# Patient Record
Sex: FEMALE | Race: WHITE | NOT HISPANIC OR LATINO | URBAN - METROPOLITAN AREA
[De-identification: names, ages, dates, MRNs, and addresses within clinical notes are randomized per-mention and may not be internally consistent; named-entity substitution may affect disease eponyms.]

---

## 2021-02-16 ENCOUNTER — RECORDS - HEALTHEAST (OUTPATIENT)
Dept: SCHEDULING | Facility: CLINIC | Age: 51
End: 2021-02-16

## 2021-02-16 DIAGNOSIS — Z12.31 VISIT FOR SCREENING MAMMOGRAM: ICD-10-CM

## 2021-03-09 ENCOUNTER — OFFICE VISIT - HEALTHEAST (OUTPATIENT)
Dept: FAMILY MEDICINE | Facility: CLINIC | Age: 51
End: 2021-03-09

## 2021-03-09 DIAGNOSIS — E66.811 CLASS 1 OBESITY DUE TO EXCESS CALORIES WITHOUT SERIOUS COMORBIDITY WITH BODY MASS INDEX (BMI) OF 30.0 TO 30.9 IN ADULT: ICD-10-CM

## 2021-03-09 DIAGNOSIS — Z12.11 ENCOUNTER FOR SCREENING COLONOSCOPY: ICD-10-CM

## 2021-03-09 DIAGNOSIS — E66.09 CLASS 1 OBESITY DUE TO EXCESS CALORIES WITHOUT SERIOUS COMORBIDITY WITH BODY MASS INDEX (BMI) OF 30.0 TO 30.9 IN ADULT: ICD-10-CM

## 2021-03-09 DIAGNOSIS — Z00.00 ENCOUNTER FOR GENERAL ADULT MEDICAL EXAMINATION WITHOUT ABNORMAL FINDINGS: ICD-10-CM

## 2021-03-09 LAB
CHOLEST SERPL-MCNC: 275 MG/DL
CHOLEST SERPL-MCNC: 275 MG/DL
FASTING STATUS PATIENT QL REPORTED: ABNORMAL
FASTING STATUS PATIENT QL REPORTED: ABNORMAL
HBA1C MFR BLD: 5.5 %
HDLC SERPL-MCNC: 68 MG/DL
HDLC SERPL-MCNC: 68 MG/DL
LDLC SERPL CALC-MCNC: 187 MG/DL
LDLC SERPL CALC-MCNC: 187 MG/DL
TRIGL SERPL-MCNC: 102 MG/DL
TRIGL SERPL-MCNC: 102 MG/DL
TSH SERPL DL<=0.005 MIU/L-ACNC: 1.35 UIU/ML (ref 0.3–5)

## 2021-03-09 ASSESSMENT — MIFFLIN-ST. JEOR: SCORE: 1498.12

## 2021-03-16 ENCOUNTER — COMMUNICATION - HEALTHEAST (OUTPATIENT)
Dept: FAMILY MEDICINE | Facility: CLINIC | Age: 51
End: 2021-03-16

## 2021-04-09 ENCOUNTER — RECORDS - HEALTHEAST (OUTPATIENT)
Dept: ADMINISTRATIVE | Facility: OTHER | Age: 51
End: 2021-04-09

## 2021-06-05 VITALS
OXYGEN SATURATION: 98 % | BODY MASS INDEX: 30.7 KG/M2 | HEIGHT: 66 IN | SYSTOLIC BLOOD PRESSURE: 122 MMHG | DIASTOLIC BLOOD PRESSURE: 82 MMHG | WEIGHT: 191 LBS | HEART RATE: 66 BPM

## 2021-06-15 NOTE — PROGRESS NOTES
FEMALE PREVENTATIVE EXAM    Assessment and Plan:     Patient has been advised of split billing requirements and indicates understanding: Yes    Problem List Items Addressed This Visit     None      Visit Diagnoses     Encounter for general adult medical examination without abnormal findings    -  Primary    Relevant Orders    Lipid [place] FASTING (Completed)    Glycosylated Hemoglobin A1c (Completed)    Thyroid Stimulating Hormone (TSH) (Completed)    Lipid Profile (Completed)    Encounter for screening colonoscopy        Relevant Orders    Ambulatory referral for Colonoscopy    Class 1 obesity due to excess calories without serious comorbidity with body mass index (BMI) of 30.0 to 30.9 in adult            Preventative counseling as noted below  UTD Pap and mammogram  No high risk behaviors identified  Due for colonoscopy, order placed today  BMI 30.83-lifestyle counseling provided  Baseline blood work obtained  Patient menopausal, not on birth control and denies any postmenopausal bleeding.  Mood stable    Next follow up:  1 year for routine preventative.     Immunization Review  Adult Imm Review: No immunizations due today  BMI: 30.83  no tobacco use    I discussed the following with the patient:   Adult Healthy Living: Importance of regular exercise  Healthy nutrition  Weight loss referral options  Getting adequate sleep  Stress management  Firearm safety  STI prevention    I have had an Advance Directives discussion with the patient.    Subjective:   Chief Complaint: Tali Rahman is an 50 y.o. female here for a preventative health visit.    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  Wanted to get an annual done before her insurance runs out.    Recently went to go see her eye doctor, her dentist  "and OB/GYN.    Had Pap smear done last week and it was normal.    Has a mammogram that is already ordered    Wanted to come here today to get a referral for colonoscopy.    Is G0, P0  Sexually active: No  Does have a boyfriend that lives out of state  Menopausal with no postmenopausal bleeding      Smoker - quit 30 years ago   ETOH = causal   Illicit drug/Marijuana/vaping  - no     Healthy Habits  Are you taking a daily aspirin? No  Do you typically exercising at least 40 min, 3-4 times per week?  Yes  Do you usually eat at least 4 servings of fruit and vegetables a day, include whole grains and fiber and avoid regularly eating high fat foods? Yes  Have you had an eye exam in the past two years? Yes  Do you see a dentist twice per year? Yes  Do you have any concerns regarding sleep? No    Safety Screen  If you own firearms, are they secured in a locked gun cabinet or with trigger locks? NO  Do you feel you are safe where you are living?: Yes (3/9/2021  2:32 PM)  Do you feel you are safe in your relationship(s)?: Yes (3/9/2021  2:32 PM)      Review of Systems:  Please see above.  The rest of the review of systems are negative for all systems.     Cancer Screening       Status Date      MAMMOGRAM Overdue 6/29/2017      Done 6/29/2016 MAMMO SCREENING BILATERAL     Patient has more history with this topic...    PAP SMEAR Postponed 6/9/2021 Originally 7/29/1990. Waiting for Documentation/Doc Correction          Patient Care Team:  Angely Pierce MD as PCP - General (Obstetrics and Gynecology)        History     Reviewed By Date/Time Sections Reviewed    Deep Martinez CMA 3/9/2021  2:36 PM Tobacco    Deep Martinez CMA 3/9/2021  2:33 PM Tobacco, Alcohol, Drug Use            Objective:   Vital Signs:   Visit Vitals  /82   Pulse 66   Ht 5' 6\" (1.676 m)   Wt 191 lb (86.6 kg)   SpO2 98%   BMI 30.83 kg/m           Physical Exam  Constitutional:       Appearance: Normal appearance. She is normal weight.   HENT: "      Head: Normocephalic and atraumatic.      Right Ear: Tympanic membrane and external ear normal. There is no impacted cerumen.      Left Ear: Tympanic membrane and external ear normal. There is no impacted cerumen.      Nose: Nose normal. No congestion or rhinorrhea.      Mouth/Throat:      Mouth: Mucous membranes are moist.      Pharynx: Oropharynx is clear. No oropharyngeal exudate or posterior oropharyngeal erythema.   Eyes:      General: No scleral icterus.        Right eye: No discharge.         Left eye: No discharge.      Extraocular Movements: Extraocular movements intact.      Conjunctiva/sclera: Conjunctivae normal.      Pupils: Pupils are equal, round, and reactive to light.   Neck:      Musculoskeletal: Normal range of motion and neck supple. No neck rigidity or muscular tenderness.      Comments: No thyromegaly  Cardiovascular:      Rate and Rhythm: Normal rate and regular rhythm.      Pulses: Normal pulses.      Heart sounds: Normal heart sounds. No murmur. No friction rub. No gallop.    Abdominal:      General: Abdomen is flat. Bowel sounds are normal. There is no distension.      Palpations: Abdomen is soft. There is no mass.      Tenderness: There is no abdominal tenderness. There is no right CVA tenderness, left CVA tenderness, guarding or rebound.      Hernia: No hernia is present.   Musculoskeletal: Normal range of motion.         General: No swelling, tenderness, deformity or signs of injury.      Right lower leg: No edema.      Left lower leg: No edema.   Lymphadenopathy:      Cervical: No cervical adenopathy.   Skin:     General: Skin is warm and dry.      Coloration: Skin is not jaundiced.      Findings: No bruising or erythema.   Neurological:      General: No focal deficit present.      Mental Status: She is alert and oriented to person, place, and time. Mental status is at baseline.      Sensory: No sensory deficit.      Motor: No weakness.      Gait: Gait normal.   Psychiatric:          Mood and Affect: Mood normal.         Behavior: Behavior normal.         The ASCVD Risk score (Renothelma CABEZAS Jr., et al., 2013) failed to calculate for the following reasons:    Cannot find a previous HDL lab    Cannot find a previous total cholesterol lab         Medication List      as of March 9, 2021 11:59 PM     You have not been prescribed any medications.

## 2021-06-21 NOTE — LETTER
Letter by Vanna Lopez DO at      Author: Vanna Lopez DO Service: -- Author Type: --    Filed:  Encounter Date: 3/16/2021 Status: (Other)        Tali JON Rahman  [address]  [place] [address]             March 16, 2021         Dear Tali Rahman,    Below are the results from Tali's recent visit:    Resulted Orders   Lipid [place] FASTING   Result Value Ref Range    Cholesterol 275 (H) <=199 mg/dL    Triglycerides 102 <=149 mg/dL    HDL Cholesterol 68 >=50 mg/dL    LDL Calculated 187 (H) <=129 mg/dL    Patient Fasting > 8hrs? Unknown    Glycosylated Hemoglobin A1c   Result Value Ref Range    Hemoglobin A1c 5.5 <=5.6 %   Thyroid Stimulating Hormone (TSH)   Result Value Ref Range    TSH 1.35 0.30 - 5.00 uIU/mL   Lipid Profile   Result Value Ref Range    Triglycerides 102 <=149 mg/dL    Cholesterol 275 (H) <=199 mg/dL    LDL Calculated 187 (H) <=129 mg/dL    HDL Cholesterol 68 >=50 mg/dL    Patient Fasting > 8hrs? Unknown         Bloodwork looks cgood except for triglyceride levels ( fatty cells in the body) and elevated LDL cholesterol. This is  related mostly to diet and exercise. The levels aren't high enough to warrant medication. Should work on cutting  out fatty foots, increasing fruit/veggie to help bring these numbers down. Will recheck in a year.     Please call with questions or contact us using [employer].    Sincerely,        Electronically signed by Vanna Lopez DO

## 2021-07-13 ENCOUNTER — RECORDS - HEALTHEAST (OUTPATIENT)
Dept: ADMINISTRATIVE | Facility: CLINIC | Age: 51
End: 2021-07-13

## 2021-07-21 ENCOUNTER — RECORDS - HEALTHEAST (OUTPATIENT)
Dept: ADMINISTRATIVE | Facility: CLINIC | Age: 51
End: 2021-07-21

## 2024-04-10 ENCOUNTER — TRANSFERRED RECORDS (OUTPATIENT)
Dept: HEALTH INFORMATION MANAGEMENT | Facility: CLINIC | Age: 54
End: 2024-04-10

## 2025-07-08 ENCOUNTER — TRANSFERRED RECORDS (OUTPATIENT)
Dept: MULTI SPECIALTY CLINIC | Facility: CLINIC | Age: 55
End: 2025-07-08
Payer: COMMERCIAL

## 2025-07-20 ENCOUNTER — HEALTH MAINTENANCE LETTER (OUTPATIENT)
Age: 55
End: 2025-07-20

## 2025-07-21 ENCOUNTER — OFFICE VISIT (OUTPATIENT)
Dept: FAMILY MEDICINE | Facility: CLINIC | Age: 55
End: 2025-07-21
Payer: COMMERCIAL

## 2025-07-21 VITALS
RESPIRATION RATE: 18 BRPM | HEART RATE: 74 BPM | DIASTOLIC BLOOD PRESSURE: 78 MMHG | SYSTOLIC BLOOD PRESSURE: 120 MMHG | BODY MASS INDEX: 35.47 KG/M2 | TEMPERATURE: 98.1 F | WEIGHT: 215.5 LBS | OXYGEN SATURATION: 96 %

## 2025-07-21 DIAGNOSIS — T88.7XXA MEDICATION SIDE EFFECTS: ICD-10-CM

## 2025-07-21 DIAGNOSIS — E66.1 CLASS 2 DRUG-INDUCED OBESITY WITH SERIOUS COMORBIDITY AND BODY MASS INDEX (BMI) OF 35.0 TO 35.9 IN ADULT: ICD-10-CM

## 2025-07-21 DIAGNOSIS — E66.812 CLASS 2 DRUG-INDUCED OBESITY WITH SERIOUS COMORBIDITY AND BODY MASS INDEX (BMI) OF 35.0 TO 35.9 IN ADULT: ICD-10-CM

## 2025-07-21 DIAGNOSIS — E78.5 HYPERLIPIDEMIA LDL GOAL <100: ICD-10-CM

## 2025-07-21 DIAGNOSIS — R41.840 POOR CONCENTRATION: Primary | ICD-10-CM

## 2025-07-21 LAB
EST. AVERAGE GLUCOSE BLD GHB EST-MCNC: 117 MG/DL
HBA1C MFR BLD: 5.7 % (ref 0–5.6)

## 2025-07-21 PROCEDURE — 99215 OFFICE O/P EST HI 40 MIN: CPT | Mod: 25

## 2025-07-21 PROCEDURE — 36415 COLL VENOUS BLD VENIPUNCTURE: CPT

## 2025-07-21 PROCEDURE — 90471 IMMUNIZATION ADMIN: CPT

## 2025-07-21 PROCEDURE — 3074F SYST BP LT 130 MM HG: CPT

## 2025-07-21 PROCEDURE — 83036 HEMOGLOBIN GLYCOSYLATED A1C: CPT

## 2025-07-21 PROCEDURE — G2211 COMPLEX E/M VISIT ADD ON: HCPCS

## 2025-07-21 PROCEDURE — 3078F DIAST BP <80 MM HG: CPT

## 2025-07-21 PROCEDURE — 90746 HEPB VACCINE 3 DOSE ADULT IM: CPT

## 2025-07-21 RX ORDER — BUPROPION HYDROCHLORIDE 150 MG/1
150 TABLET ORAL EVERY MORNING
Qty: 30 TABLET | Refills: 0 | Status: SHIPPED | OUTPATIENT
Start: 2025-07-21

## 2025-07-21 RX ORDER — ONDANSETRON 4 MG/1
4 TABLET, ORALLY DISINTEGRATING ORAL EVERY 8 HOURS PRN
Qty: 21 TABLET | Refills: 1 | Status: SHIPPED | OUTPATIENT
Start: 2025-07-21

## 2025-07-21 NOTE — PROGRESS NOTES
"  Assessment & Plan     Poor concentration / Concern for ADHD  Symptoms: Disorganized thought process, over-talking, talking fast, difficulty completing tasks without time constraints, difficulty with organization, need for visual cues, fidgeting, restlessness, difficulty concentrating, talking over others, and other symptoms consistent with ADHD tendencies. Differential diagnosis: ADHD vs. other causes of poor concentration (e.g., mood disorder, medication side effects).  I discussed with patient that she is having some symptoms concerning for ADHD however her soft checklist was not overtly positive for ADHD at this time I cannot prescribe stimulants until she gets formal diagnosis testing.  In the meantime I can start a nonstimulant medication that could help address concentration and organization.  Patient does not have history of seizures or anorexia.  - Referral to neuropsychology for comprehensive ADHD testing.  - Start bupropion (Wellbutrin), a non-stimulant medication, to address concentration, organization, and energy.  - Monitor for side effects, especially potential worsening of anxiety.  - Follow-up in one month to assess response to medication and review symptoms.  - Consider dose adjustment of bupropion based on response at follow-up.  - Adult Mental Health  Referral; Future  - buPROPion (WELLBUTRIN XL) 150 MG 24 hr tablet; Take 1 tablet (150 mg) by mouth every morning.    BMI 35.0-35.9,adult  Chronic. Symptoms: Significant weight gain, history of binge eating, current weight 215 lbs, BMI 35.47, eating pattern described as \"nothing or everything,\" improved mental clarity and mood with better eating habits. Comorbidity: Hyperlipidemia.  - Hemoglobin A1c; Future  - tirzepatide-weight management (ZEPBOUND) 5 MG/0.5ML vial; Inject 0.5 mLs (5 mg) subcutaneously once a week.    Class 2 drug-induced obesity with serious comorbidity and body mass index (BMI) of 35.0 to 35.9 in adult  Discussed options " for weight loss medications including GLP-1 inhibitors versus a stimulant such as phentermine/topiramate.  Patient opts to do Zepbound first.  Will run through insurance to see if this is covered.  If this is not covered by insurance we will do self-pay.   Patient to follow-up in 60 days to recheck vital signs and gauge responsiveness to medication.  Consider titrating up by the medication slowly.    Discussed side effects of both medications in depth today: Discussed the risk of weight gain after discontinuing medications.  That some studies report gaining the weight back in 6 to 12 months after cessation of medication and that the long-term plan is for healthy lifestyle and sustaining such.  Zepbound :nausea vomiting, abdominal pain, diarrhea, constipation, gastroparesis.  Discussed not eating 2 hours before laying down with this medication.  Discussed how to administer subcu injection.  Patient to ask further questions with her physician upon receiving the medication.    Discussed the importance of physical activity.  Goal for physical activity should be 30 minutes 5 days a week minimum.  Patient's personal goal is to be active at least 2-3 days a week.   Discussed nutrition in depth.  Discussed recommendation for balanced sustainable nutrition.  Diet should include high amounts of Fiber from fruits and vegetables in addition to a quarter protein and core carbohydrates.  I recommend against complete cessation of carbohydrates for long-term nutrition plan.   Also we will check for comorbidities such as diabetes mellitus type 2 And high cholesterol.  Previous labs reviewed.  Prescription for Zofran provided to manage potential nausea. Follow-up in eight weeks to assess progress. Risks and side effects: Zepbound may cause nausea, vomiting, GI upset, and potential hair loss.  - Hemoglobin A1c; Future  - tirzepatide-weight management (ZEPBOUND) 5 MG/0.5ML vial; Inject 0.5 mLs (5 mg) subcutaneously once a  week.    Hyperlipidemia LDL goal <100  Medication side effects  Chronic. Symptoms: High cholesterol, LDL cholesterol 200 mg/dL (twice the goal), high glucose on recent labs.  - Provided dietary counseling and information on cholesterol-lowering diets (DASH, Mediterranean).  - Deferred initiation of statin therapy to allow for trial of dietary and lifestyle modification.  - Re-evaluate cholesterol and lipid panel in six months.  - If cholesterol remains elevated, consider starting statin medication.  - Monitor for improvement in cholesterol and glucose with weight loss and dietary changes.  - Hemoglobin A1c; Future  - ondansetron (ZOFRAN ODT) 4 MG ODT tab; Take 1 tablet (4 mg) by mouth every 8 hours as needed for nausea.    Spent 50mins doing chart review, history and exam, patient education, lab review, result management, documentation and further activities per the note.The longitudinal plan of care for the diagnosis(es)/condition(s) as documented were addressed during this visit. Due to the added complexity in care, I will continue to support Tali in the subsequent management and with ongoing continuity of care.    Subjective   Tali is a 54 year old, presenting for the following health issues:  RECHECK (ADHD, menopause)        7/21/2025    11:28 AM   Additional Questions   Roomed by Leola VAUGHN MA     Via the Health Maintenance questionnaire, the patient has reported the following services have been completed -Mammogram: [facility] 2025-07-08, this information has been sent to the abstraction team.  History of Present Illness       Hyperlipidemia:  She presents for follow up of hyperlipidemia.   She is not taking medication to lower cholesterol. She is not having myalgia or other side effects to statin medications.    Reason for visit:  Follow up    She eats 0-1 servings of fruits and vegetables daily.She consumes 0 sweetened beverage(s) daily.She exercises with enough effort to increase her heart rate 10 to 19  "minutes per day.  She exercises with enough effort to increase her heart rate 3 or less days per week.   She is taking medications regularly.  Tali Rahman, 54 years    Attention and concentration difficulties  - Reported feeling disorganized in thought process at times, but not always  - Noted over-talking, with brain moving faster than mouth, leading to talking fast and sometimes talking over others  - Difficulty completing tasks unless given a specific time frame; more productive with time constraints  - Difficulty with organization at home; appreciates neatness but is not a neat person  - Needs to see items visually or they are forgotten (\"if it's in a drawer, it doesn't exist\")  - Often has difficulty getting things done in order when a task requires organization   - Avoids or delays starting tasks that require a lot of thought  -  Often fidgets or squirms with hands or feet when sitting for long periods: quite often  - Sometimes feels overly active or compelled to do things as if driven by a motor  - Sometimes makes careless mistakes on boring or difficult projects  - Sometimes has difficulty keeping attention during boring or repetitive work  - Very often has difficulty concentrating on what people say, even when spoken to directly  - Sometimes misplaces or has difficulty finding things at work or home; describes as \"organized chaos\"  - Sometimes distracted by activity or noise around her  - Sometimes leaves seat in meetings or situations where expected to remain seated  - Sometimes has difficulty waiting her turn in situations requiring turn-taking  - No difficulty unwinding and relaxing when having time to herself; describes herself as \"pretty good relaxer\" and enjoys spa activities  - Sometimes finds herself talking too much in social situations  - Often talks over others in conversation, but does not finish their sentences  - Sometimes interrupts others when they are busy  - No direct complaints about " "sleep reported    Weight concerns and eating patterns  - Reported significant weight gain prior to visit; current weight 215 lbs, BMI 35.47 (obesity class 2)  - Described eating pattern as either \"nothing or everything\"  - When eating well, experiences improved mental clarity and mood; recently has been eating better and feeling better mentally  - History of binge eating episodes leading to rapid weight gain of 7-8 lbs at a time when not following healthy habits  -Since 2021 she has had a significant increase in her weight which she was 191lb in 2021 and now currently is 215 pounds  -She is interested in weight loss medication and check with insurance and understands that the GLP-1 agonist medication is not covered with insurance as she has medical assistance insurance however she is willing to self-pay through [facility]  -Initial BMI: 35.47,  215lb  -Diet has been good        Objective    /78   Pulse 74   Temp 98.1  F (36.7  C) (Oral)   Resp 18   Wt 97.8 kg (215 lb 8 oz)   SpO2 96%   BMI 35.47 kg/m    Body mass index is 35.47 kg/m .  Physical Exam   GENERAL: alert and no distress  EYES: Eyes grossly normal to inspection, PERRL and conjunctivae and sclerae normal  RESP: Normal breathing with normal effort satting at 96% on RA  CV: regular rate   ABDOMEN: soft, nontender, no hepatosplenomegaly, no masses and bowel sounds normal  MS: no gross musculoskeletal defects noted, no edema  SKIN: no suspicious lesions or rashes  PSYCH: mentation appears normal, affect normal/bright    Signed Electronically by: Medhat Calderon MD    "

## 2025-08-20 ASSESSMENT — ANXIETY QUESTIONNAIRES
7. FEELING AFRAID AS IF SOMETHING AWFUL MIGHT HAPPEN: NOT AT ALL
GAD7 TOTAL SCORE: 0
8. IF YOU CHECKED OFF ANY PROBLEMS, HOW DIFFICULT HAVE THESE MADE IT FOR YOU TO DO YOUR WORK, TAKE CARE OF THINGS AT HOME, OR GET ALONG WITH OTHER PEOPLE?: NOT DIFFICULT AT ALL
2. NOT BEING ABLE TO STOP OR CONTROL WORRYING: NOT AT ALL
5. BEING SO RESTLESS THAT IT IS HARD TO SIT STILL: NOT AT ALL
7. FEELING AFRAID AS IF SOMETHING AWFUL MIGHT HAPPEN: NOT AT ALL
4. TROUBLE RELAXING: NOT AT ALL
GAD7 TOTAL SCORE: 0
3. WORRYING TOO MUCH ABOUT DIFFERENT THINGS: NOT AT ALL
1. FEELING NERVOUS, ANXIOUS, OR ON EDGE: NOT AT ALL
GAD7 TOTAL SCORE: 0
6. BECOMING EASILY ANNOYED OR IRRITABLE: NOT AT ALL
IF YOU CHECKED OFF ANY PROBLEMS ON THIS QUESTIONNAIRE, HOW DIFFICULT HAVE THESE PROBLEMS MADE IT FOR YOU TO DO YOUR WORK, TAKE CARE OF THINGS AT HOME, OR GET ALONG WITH OTHER PEOPLE: NOT DIFFICULT AT ALL

## 2025-08-20 ASSESSMENT — PATIENT HEALTH QUESTIONNAIRE - PHQ9
SUM OF ALL RESPONSES TO PHQ QUESTIONS 1-9: 0
SUM OF ALL RESPONSES TO PHQ QUESTIONS 1-9: 0
10. IF YOU CHECKED OFF ANY PROBLEMS, HOW DIFFICULT HAVE THESE PROBLEMS MADE IT FOR YOU TO DO YOUR WORK, TAKE CARE OF THINGS AT HOME, OR GET ALONG WITH OTHER PEOPLE: NOT DIFFICULT AT ALL

## 2025-08-21 ENCOUNTER — OFFICE VISIT (OUTPATIENT)
Dept: FAMILY MEDICINE | Facility: CLINIC | Age: 55
End: 2025-08-21
Payer: COMMERCIAL

## 2025-08-21 VITALS
HEIGHT: 65 IN | HEART RATE: 78 BPM | WEIGHT: 200.5 LBS | DIASTOLIC BLOOD PRESSURE: 68 MMHG | RESPIRATION RATE: 16 BRPM | BODY MASS INDEX: 33.41 KG/M2 | OXYGEN SATURATION: 96 % | SYSTOLIC BLOOD PRESSURE: 108 MMHG | TEMPERATURE: 97.8 F

## 2025-08-21 DIAGNOSIS — E66.1 CLASS 2 DRUG-INDUCED OBESITY WITH SERIOUS COMORBIDITY AND BODY MASS INDEX (BMI) OF 35.0 TO 35.9 IN ADULT: ICD-10-CM

## 2025-08-21 DIAGNOSIS — R41.840 POOR CONCENTRATION: ICD-10-CM

## 2025-08-21 DIAGNOSIS — E66.812 CLASS 2 DRUG-INDUCED OBESITY WITH SERIOUS COMORBIDITY AND BODY MASS INDEX (BMI) OF 35.0 TO 35.9 IN ADULT: ICD-10-CM

## 2025-08-21 RX ORDER — BUPROPION HYDROCHLORIDE 150 MG/1
150 TABLET ORAL EVERY MORNING
Qty: 90 TABLET | Refills: 0 | Status: SHIPPED | OUTPATIENT
Start: 2025-09-20